# Patient Record
(demographics unavailable — no encounter records)

---

## 2025-03-31 NOTE — HISTORY OF PRESENT ILLNESS
[FreeTextEntry1] : 57M hx CAD with four stents most recent 2009 LAD/2013 pLAD/2016 RCA, CHF since 2013, Sub -Q ICD 2022 [employer], PAF was on xarelto but per outpatient chart 01/22/2025 was stopped due to change of insurance and cost issue, most cardiac care was with [facility] now see Dr Rosenberg for the past year. Presents with chest burning left side mid axillary line associated with left arm paresthesia. now s/p mid LAD ISR PCI at [facility] 03/2025 with Dr. Knox.   [facility] 03/28/25 discharged: chest burning pain s/p PCI shockwave IVL LAD: 70% mid LAD ISR treated with Shockwave IVL with a 3.0 X 12 balloon, angioplasty with a 3.0 X 10 Angiosculpt balloon, and a  3.0 X 38 [place] WAYLON postdilated with a 3.5 X 15 NC Emerge balloon. There was also an 80% D2 stenosis which was treated with 3.0 X 20 Euphora balloon.       LCX: 40% pLCX disease.  now presents for cardiovascular hospital follow up and establish care. not taking xarelto, cannot afford due to monetary restriction and does not want to take taking DAPT, asa 81mg qday and plavix 75mg qday without missed doses taking losartan, statin crestor, toprol xl without missed doses, aldactone cannot afford ARNI, entresto or SGLT2i  active, all his chest burning symptoms have resolved, reports no tobacco use since stent denies alcohol use, illicit drug use is able to ambulate stairs and goes for walks without limitation of functional capacity, not interested in cardiac rehab. Denies chest pain/pressure, palpitations, irregular and/or rapid heart beat, SOB, ANAYA, syncope/near syncope, dizziness, orthopnea, PND, cough, edema, f/c, n/v/d, hematuria, or hematochezia. Denies claudication, PAD, venous changes, Erectile dysfunction, TIA/CVA history, dizziness, amaroux fugax, vision/sensory changes.

## 2025-03-31 NOTE — ASSESSMENT
[FreeTextEntry1] : 57M hx CAD with four stents most recent 2009 LAD/2013 pLAD/2016 RCA, CHF since 2013, Sub -Q ICD 2022 [employer], PAF was on xarelto but per outpatient chart 01/22/2025 was stopped due to change of insurance and cost issue, most cardiac care was with [facility] now see Dr Rosenberg for the past year. Presents with chest burning left side mid axillary line associated with left arm paresthesia. now s/p mid LAD ISR PCI at [facility] 03/2025 with Dr. Knox.

## 2025-03-31 NOTE — DISCUSSION/SUMMARY
[EKG obtained to assist in diagnosis and management of assessed problem(s)] : EKG obtained to assist in diagnosis and management of assessed problem(s) [FreeTextEntry1] : iscehmic cardiomyopathy CAD   - Had chest burning which is now fully resolved, still has left arm paresthesia  - TTE showing worsening EF compared to 07/24/2024 which was 40-45% with WMA. Now 25-30%, WMA - Sub-q ICD interrogation noted - S/p LHC mLAD ISR 75% s/p WAYLON x2 LAD: 70% mid LAD ISR treated with Shockwave IVL with a 3.0 X 12 balloon, angioplasty with a 3.0 X 10 Angiosculpt balloon, and a  3.0 X 38 [place] WAYLON postdilated with a 3.5 X 15 NC Emerge balloon. There was also an 80% D2 stenosis which was treated with 3.0 X 20 Euphora balloon.       LCX: 40% pLCX disease. - Triple therapy for one month per IC: patient not taking xarelto - DAPT ASA + plavix LDL goal <55  Chronic HFrEF  ischemic cardiomyopathy - check 2d TTE in 2-3 months. - EF now worsening from prior, now 25-30% - Euvolemic on exam -  - c/w Toprol XL 25mg  - c/w Losartan 25mg  - c/w Spironolactone 25mg  - Diuretic: hold off at this time - refuses ARNI  s/p ICD - remote interrgation in hospital and outpatient, no events - denies any discharge/shocks - continue to monitor  Paroxysmal atrial fibrillation.  - currently SR - No outpatient data to support afib - Device interrogation noted in house. Outpatient interrogation from 2/19/25 to 3/20/25 no afib noted. - Taking Xarelto for PAF last dose 3/25 AM.  Was discontinued off Xarelto outpatient due to cost and insurance changes and was started on ASA - No outpatient data to support afib - Sub-q ICD interrogation noted in house. Outpatient interrogation from 2/19/25 to 3/20/25 no afib noted.   screening PHQ 2 screen completed, reviewed with patient and negative at office appointment. Screen for unhealthy alcohol use with screening instrument completed at encounter and negative on review with patient.  tobacco  Patients willingness to attempt to quit is discussed. They will make effort to reduce and abstain. We discussed tobacco use of any kind, its impact on the Cardiovascular system including atherosclerosis and inflammatory changes on Cardiovascular system. I've offered them advice on medications, resources online and positive reinforcement including methods on quitting. Spent between 3-10 minutes counseling. We agreed at our next appointment the patient should be either significantly reduced from tobacco products or completely in abstinence.  Resources made available to the patient   As with all my patients, I would strongly pursue preventative cardiology, lifestyle modifications which are necessary for long-term cardiovascular health.  The following is recommended: Advised a plant based, limited salt, low fat, minimal dairy diet, stress reduction/psychosomatic management, sleep hygiene/ALICJA testing and healthy weight loss, annual influenza vaccine, medication compliance, high risk behavior mitigation (I.e. tobacco abstinence, alcohol abstinence, recreational/illicit drug use abstinence), increased exercise activity as per AHA/ACC standard for long term cardiovascular risk reduction.  The patient is aware of alarm cardiac type symptoms, will call with questions or concerns and is aware to activate 911 and/or present to the closest emergency department if concerns.  Follow up: with me in 3 months

## 2025-03-31 NOTE — PHYSICAL EXAM
[Well Developed] : well developed [Well Nourished] : well nourished [No Acute Distress] : no acute distress [Normal Conjunctiva] : normal conjunctiva [Normal Venous Pressure] : normal venous pressure [No Carotid Bruit] : no carotid bruit [Normal S1, S2] : normal S1, S2 [No Murmur] : no murmur [No Rub] : no rub [No Gallop] : no gallop [Clear Lung Fields] : clear lung fields [Good Air Entry] : good air entry [No Respiratory Distress] : no respiratory distress  [Normal Bowel Sounds] : normal bowel sounds [Normal Gait] : normal gait [No Edema] : no edema [No Cyanosis] : no cyanosis [No Clubbing] : no clubbing [No Varicosities] : no varicosities [Normal Radial B/L] : normal radial B/L [Normal PT B/L] : normal PT B/L [No Rash] : no rash [No Skin Lesions] : no skin lesions [Moves all extremities] : moves all extremities [No Focal Deficits] : no focal deficits [Normal Speech] : normal speech [Alert and Oriented] : alert and oriented [Normal memory] : normal memory [de-identified] : mild eccymoses various stages of healing in radial site, no bruit, fluctuance noted.

## 2025-06-24 NOTE — PHYSICAL EXAM
[Well Developed] : well developed [No Acute Distress] : no acute distress [Normal Conjunctiva] : normal conjunctiva [Normal Venous Pressure] : normal venous pressure [Normal S1, S2] : normal S1, S2 [No Murmur] : no murmur [Clear Lung Fields] : clear lung fields [No Respiratory Distress] : no respiratory distress  [Normal Gait] : normal gait [No Edema] : no edema [No Clubbing] : no clubbing [No Varicosities] : no varicosities [Normal Radial B/L] : normal radial B/L [Normal PT B/L] : normal PT B/L [No Rash] : no rash [No Skin Lesions] : no skin lesions [Moves all extremities] : moves all extremities [No Focal Deficits] : no focal deficits [Normal Speech] : normal speech [Alert and Oriented] : alert and oriented [Normal memory] : normal memory

## 2025-06-24 NOTE — ASSESSMENT
[FreeTextEntry1] : 57M hx CAD with four stents most recent 2009 LAD/2013 pLAD/2016 RCA, CHF since 2013, Sub -Q ICD 2022 [employer], PAF was on xarelto but per outpatient chart 01/22/2025 was stopped due to change of insurance and cost issue, most cardiac care was with [facility] now see Dr Rosenberg for the past year. Presents with chest burning left side mid axillary line associated with left arm paresthesia. now s/p mid LAD ISR PCI at [facility] 03/2025 with Dr. Knox.

## 2025-06-24 NOTE — END OF VISIT
[FreeTextEntry3] :  I, Dr. Beckham personally performed the evaluation and management of services for this new patient.  That E/M includes conducting the initial examination, assessing all conditions, and establishing the plan of care.  Today, my ACP Talisha Corea [facility], was here to observe my evaluation and management services for this patient to be followed going forward.

## 2025-06-24 NOTE — HISTORY OF PRESENT ILLNESS
[FreeTextEntry1] : 57M hx CAD with four stents most recent 2009 LAD/2013 pLAD/2016 RCA, CHF since 2013, Sub -Q ICD 2022 [employer], PAF was on xarelto but per outpatient chart 01/22/2025 was stopped due to change of insurance and cost issue, most cardiac care was with [facility] now see Dr Rosenberg for the past year. Presents with chest burning left side mid axillary line associated with left arm paresthesia. now s/p mid LAD ISR PCI at [facility] 03/2025 with Dr. Knox.   On 03/28/25 he presented to [facility] for chest burning pain s/p PCI shockwave IVL LAD: 70% mid LAD ISR treated with Shockwave IVL with a 3.0 X 12 balloon, angioplasty with a 3.0 X 10 Angiosculpt balloon, and a  3.0 X 38 [place] WAYLON post dilated with a 3.5 X 15 NC Emerge balloon. There was also an 80% D2 stenosis which was treated with 3.0 X 20 Euphora balloon. LCX: 40% pLCX disease.   Patient reports to the office today to establish care with EP for S-ICD device and pAF. He reports to be feeling well and denies symptoms of arrhythmia without c/o palpitations, chest pain/tightness, dizziness, syncope or near syncope. He has been free from chest pain since Cath procedure. He has not taken Xarelto d/t cost, but remains on DAPT with , ASA 81mg QD and Plavix 75mg QD. He recently quit tobacco.   Cardiac medications include: taking losartan, statin crestor, toprol 25mg xl without missed doses, aldactone cannot afford ARNI, Entresto or SGLT2i   [place] S-ICD: Device interrogation reveals good function, Battery status 64%. System impedance 65 Ohms, No events on his device.  Needs MRI of Head, - ok from device standpoint.

## 2025-06-24 NOTE — DISCUSSION/SUMMARY
[EKG obtained to assist in diagnosis and management of assessed problem(s)] : EKG obtained to assist in diagnosis and management of assessed problem(s) [FreeTextEntry1] : 57M hx CAD with four stents most recent 2009 LAD/2013 pLAD/2016 RCA, CHF since 2013, Sub -Q ICD 2022 [employer], PAF was on xarelto but per outpatient chart 01/22/2025 was stopped due to change of insurance and cost issue, most cardiac care was with [facility] now see Dr Rosenberg for the past year. Presents with chest burning left side mid axillary line associated with left arm paresthesia. Now s/p mid LAD ISR PCI at [facility] 03/2025 with Dr. Knox.  Patient reported to the office today to establish care with EP for S-ICD device and pAF. He reports to be feeling well and denies symptoms of arrhythmia without c/o palpitations, chest pain/tightness, dizziness, syncope or near syncope. He has been free from chest pain since Cath procedure. He has not taken Xarelto d/t cost, but remains on DAPT with, ASA 81mg qday and Plavix 75mg QD. He recently quit tobacco. Device interrogation reveals normal function, no new events on device, no AF. Recommend restarting OAC with Dabigatran once he completes ASA 81mg to avoid triple therapy. He is agreeable to this plan.   Recommendations -Transfer remote monitor- forms filled out in office for remote monitoring.  -Sub Q ICD with good function- ok for MRI.  -Start Dabigatran for pAF 150mg BID.  -Will stop ASA in one month- and start OAC then (Hx of TIA/HTN/CAD)  -Continue Metoprolol 25mg  -EP f/u in 6 months or sooner PRN for DVC.  -F/u with Cardiologist Dr. Medina.

## 2025-06-24 NOTE — REVIEW OF SYSTEMS
[Negative] : Heme/Lymph [Feeling Fatigued] : not feeling fatigued [SOB] : no shortness of breath [Dyspnea on exertion] : not dyspnea during exertion [Chest Discomfort] : no chest discomfort [Lower Ext Edema] : no extremity edema [Palpitations] : no palpitations [Syncope] : no syncope [Cough] : no cough [Rash] : no rash [Dizziness] : no dizziness [Easy Bleeding] : no tendency for easy bleeding [Easy Bruising] : no tendency for easy bruising [FreeTextEntry5] : per hpi

## 2025-07-25 NOTE — DISCUSSION/SUMMARY
[FreeTextEntry1] : iscehmic cardiomyopathy CAD   - Had chest burning which is now fully resolved, still has left arm paresthesia  - TTE showing worsening EF compared to 07/24/2024 which was 40-45% with WMA. Now 25-30%, WMA - Sub-q ICD interrogation noted - S/p LHC mLAD ISR 75% s/p WAYLON x2 LAD: 70% mid LAD ISR treated with Shockwave IVL with a 3.0 X 12 balloon, angioplasty with a 3.0 X 10 Angiosculpt balloon, and a  3.0 X 38 [place] WAYLON postdilated with a 3.5 X 15 NC Emerge balloon. There was also an 80% D2 stenosis which was treated with 3.0 X 20 Euphora balloon.       LCX: 40% pLCX disease. - plavix + pradaxa per EP LDL goal <55  Chronic HFrEF  ischemic cardiomyopathy - EF 25-30% --> 35-40% - Euvolemic on exam -  - c/w Toprol XL 25mg  - c/w Losartan 25mg  - c/w Spironolactone 25mg  - refuses ARNI, SGLT2i  s/p ICD - remote interrgation in hospital and outpatient, no events - denies any discharge/shocks - continue to monitor  Paroxysmal atrial fibrillation.  - currently SR - No outpatient data to support afib - Device interrogation noted in house. Outpatient interrogation from 2/19/25 to 3/20/25 no afib noted. - Taking Xarelto for PAF last dose 3/25 AM.  Was discontinued off Xarelto outpatient due to cost and insurance changes and was started on ASA - No outpatient data to support afib - Sub-q ICD interrogation noted in house. Outpatient interrogation from 2/19/25 to 3/20/25 no afib noted.   tobacco  Patient was counseled regarding tobacco use today for 5 minutes. Patient currently reports smoking and/or Vaping 1/2 ppd x 15 years. Discussed health risks associated with continued tobacco use including increased risk of heart disease, stroke, lung cancer, COPD, and poor wound healing. Reviewed benefits of quitting including improved lung function, decreased risk of cancer, and improved cardiovascular health. Patient expressed understanding of health risks but stated they are not ready to quit at this time. Provided information on cessation resources including quitline (1-800-QUIT-NOW) and nicotine replacement options (such as patches, gum, replacement, prescription meds) should they decide to quit in the future. Expressed willingness to discuss cessation strategies at future visits when patient feels ready. Will continue to address at subsequent appointments. Discussed to strongly pursue preventative cardiology, lifestyle modifications which are necessary for long-term cardiovascular health.  Provided structured, face-to-face preventive counseling focused on Advised a plant based, limited salt, low fat, minimal dairy diet, stress reduction/psychosomatic management, sleep hygiene/ALICJA testing and healthy weight loss, annual influenza vaccine, medication compliance, high risk behavior mitigation (I.e. tobacco abstinence, alcohol abstinence, recreational/illicit drug use abstinence), increased exercise activity as per AHA/ACC standard for long term cardiovascular risk reduction. lasting approximately 15 minutes. Discussed health risks, provided evidence-based guidance, and engaged the patient in shared decision-making to promote behavior change. Patient was receptive and actively participated in the discussion. The patient is aware of alarm cardiac type symptoms, will call with questions or concerns and is aware to activate 911 and/or present to the closest emergency department if concerns.  Follow up: with me in 3-4 month  [EKG obtained to assist in diagnosis and management of assessed problem(s)] : EKG obtained to assist in diagnosis and management of assessed problem(s)

## 2025-07-25 NOTE — ASSESSMENT
[FreeTextEntry1] : 57M hx CAD with four stents most recent 2009 LAD/2013 pLAD/2016 RCA, CHF since 2013, Sub -Q ICD 2022 [employer], PAF was on xarelto but per outpatient chart 01/22/2025 was stopped due to change of insurance and cost issue, most cardiac care was with [facility] now see Dr Rosenberg for the past year. Presents with chest burning left side mid axillary line associated with left arm paresthesia. now s/p mid LAD ISR PCI at [facility] 03/2025 with Dr. Knox.

## 2025-07-25 NOTE — HISTORY OF PRESENT ILLNESS
[FreeTextEntry1] : 57M hx CAD with four stents most recent 2009 LAD/2013 pLAD/2016 RCA, CHF since 2013, Sub -Q ICD 2022 [employer], PAF was on xarelto but per outpatient chart 01/22/2025 was stopped due to change of insurance and cost issue, most cardiac care was with [facility] now see Dr oRsenberg for the past year. Presents with chest burning left side mid axillary line associated with left arm paresthesia. now s/p mid LAD ISR PCI at [facility] 03/2025 with Dr. Knox.   [facility] 03/28/25 discharged: chest burning pain s/p PCI shockwave IVL LAD: 70% mid LAD ISR treated with Shockwave IVL with a 3.0 X 12 balloon, angioplasty with a 3.0 X 10 Angiosculpt balloon, and a  3.0 X 38 [place] WAYLON postdilated with a 3.5 X 15 [place] Emerge balloon. There was also an 80% D2 stenosis which was treated with 3.0 X 20 Euphora balloon.       LCX: 40% pLCX disease.  now presents for cardiovascular hospital follow up and establish care. not taking xarelto, cannot afford due to monetary restriction and does not want to take taking DAPT, asa 81mg qday and plavix 75mg qday without missed doses taking losartan, statin crestor, toprol xl without missed doses, aldactone cannot afford ARNI, entresto or SGLT2i  active, all his chest burning symptoms have resolved, reports no tobacco use since stent denies alcohol use, illicit drug use is able to ambulate stairs and goes for walks without limitation of functional capacity, not interested in cardiac rehab. Denies chest pain/pressure, palpitations, irregular and/or rapid heart beat, SOB, ANAYA, syncope/near syncope, dizziness, orthopnea, PND, cough, edema, f/c, n/v/d, hematuria, or hematochezia. Denies claudication, PAD, venous changes, Erectile dysfunction, TIA/CVA history, dizziness, amaroux fugax, vision/sensory changes.   07/2025: returns for follow up has right ICA dilation 9mm, wants to see neurosurgery, had MRI from PCP. had whoosing sound in head taking pradaxa and plavix no symptoms still smoking cigarettes which he has been told to stop  BP controlled at home

## 2025-07-25 NOTE — PHYSICAL EXAM
[Well Developed] : well developed [Well Nourished] : well nourished [No Acute Distress] : no acute distress [Normal Conjunctiva] : normal conjunctiva [Normal Venous Pressure] : normal venous pressure [No Carotid Bruit] : no carotid bruit [Normal S1, S2] : normal S1, S2 [No Murmur] : no murmur [No Rub] : no rub [No Gallop] : no gallop [Clear Lung Fields] : clear lung fields [Good Air Entry] : good air entry [No Respiratory Distress] : no respiratory distress  [Normal Bowel Sounds] : normal bowel sounds [Normal Gait] : normal gait [No Edema] : no edema [No Cyanosis] : no cyanosis [No Clubbing] : no clubbing [No Varicosities] : no varicosities [Normal Radial B/L] : normal radial B/L [Normal PT B/L] : normal PT B/L [No Rash] : no rash [No Skin Lesions] : no skin lesions [Moves all extremities] : moves all extremities [No Focal Deficits] : no focal deficits [Normal Speech] : normal speech [Alert and Oriented] : alert and oriented [Normal memory] : normal memory [de-identified] : mild eccymoses various stages of healing in radial site, no bruit, fluctuance noted.